# Patient Record
Sex: MALE | Race: WHITE | Employment: UNEMPLOYED | URBAN - METROPOLITAN AREA
[De-identification: names, ages, dates, MRNs, and addresses within clinical notes are randomized per-mention and may not be internally consistent; named-entity substitution may affect disease eponyms.]

---

## 2018-01-23 ENCOUNTER — OFFICE VISIT (OUTPATIENT)
Dept: FAMILY MEDICINE CLINIC | Age: 28
End: 2018-01-23
Payer: COMMERCIAL

## 2018-01-23 VITALS
HEART RATE: 64 BPM | TEMPERATURE: 97.3 F | WEIGHT: 207.6 LBS | DIASTOLIC BLOOD PRESSURE: 74 MMHG | OXYGEN SATURATION: 98 % | BODY MASS INDEX: 35.44 KG/M2 | HEIGHT: 64 IN | SYSTOLIC BLOOD PRESSURE: 114 MMHG

## 2018-01-23 DIAGNOSIS — M77.9 TENDONITIS: ICD-10-CM

## 2018-01-23 DIAGNOSIS — F17.200 TOBACCO DEPENDENCE: ICD-10-CM

## 2018-01-23 DIAGNOSIS — Z23 FLU VACCINE NEED: ICD-10-CM

## 2018-01-23 DIAGNOSIS — Z23 NEED FOR TDAP VACCINATION: ICD-10-CM

## 2018-01-23 DIAGNOSIS — M54.50 MIDLINE LOW BACK PAIN WITHOUT SCIATICA, UNSPECIFIED CHRONICITY: ICD-10-CM

## 2018-01-23 DIAGNOSIS — J45.30 MILD PERSISTENT ASTHMA WITHOUT COMPLICATION: Primary | ICD-10-CM

## 2018-01-23 PROCEDURE — G8417 CALC BMI ABV UP PARAM F/U: HCPCS | Performed by: NURSE PRACTITIONER

## 2018-01-23 PROCEDURE — 90471 IMMUNIZATION ADMIN: CPT | Performed by: NURSE PRACTITIONER

## 2018-01-23 PROCEDURE — 90715 TDAP VACCINE 7 YRS/> IM: CPT | Performed by: NURSE PRACTITIONER

## 2018-01-23 PROCEDURE — 90472 IMMUNIZATION ADMIN EACH ADD: CPT | Performed by: NURSE PRACTITIONER

## 2018-01-23 PROCEDURE — 99203 OFFICE O/P NEW LOW 30 MIN: CPT | Performed by: NURSE PRACTITIONER

## 2018-01-23 PROCEDURE — 90630 INFLUENZA, QUADV, 18-64 YRS, ID, PF, MICRO INJ, 0.1ML (FLUZONE QUADV, PF): CPT | Performed by: NURSE PRACTITIONER

## 2018-01-23 RX ORDER — ALBUTEROL SULFATE 90 UG/1
2 AEROSOL, METERED RESPIRATORY (INHALATION) EVERY 6 HOURS PRN
COMMUNITY
End: 2018-01-31 | Stop reason: SDUPTHER

## 2018-01-23 RX ORDER — TOPIRAMATE 50 MG/1
50 TABLET, FILM COATED ORAL
COMMUNITY
End: 2018-03-20 | Stop reason: SDUPTHER

## 2018-01-23 RX ORDER — LEVALBUTEROL TARTRATE 45 UG/1
1-2 AEROSOL, METERED ORAL EVERY 4 HOURS PRN
COMMUNITY
End: 2018-01-31 | Stop reason: SDUPTHER

## 2018-01-23 RX ORDER — MELATONIN/LEMON BALM LEAF EXTR 5MG-500MCG
TABLET ORAL
Qty: 30 PATCH | Refills: 1 | Status: SHIPPED | OUTPATIENT
Start: 2018-01-23 | End: 2018-03-20 | Stop reason: SDUPTHER

## 2018-01-23 RX ORDER — MELATONIN/LEMON BALM LEAF EXTR 5MG-500MCG
TABLET ORAL
Refills: 0 | COMMUNITY
Start: 2018-01-10 | End: 2018-01-23 | Stop reason: SDUPTHER

## 2018-01-23 ASSESSMENT — ENCOUNTER SYMPTOMS
SHORTNESS OF BREATH: 1
CHEST TIGHTNESS: 0
ABDOMINAL PAIN: 0
COUGH: 0
CONSTIPATION: 0
DIARRHEA: 0
VOMITING: 0
NAUSEA: 0
SORE THROAT: 0
WHEEZING: 1
FREQUENT THROAT CLEARING: 1
BACK PAIN: 1

## 2018-01-23 NOTE — PROGRESS NOTES
Visit Information    Have you changed or started any medications since your last visit including any over-the-counter medicines, vitamins, or herbal medicines? no   Have you stopped taking any of your medications? Is so, why? -  no  Are you having any side effects from any of your medications? - no    Have you seen any other physician or provider since your last visit?  no   Have you had any other diagnostic tests since your last visit?  no   Have you been seen in the emergency room and/or had an admission in a hospital since we last saw you?  no   Have you had your routine dental cleaning in the past 6 months?  no     Do you have an active MyChart account? If no, what is the barrier? No: na    Patient Care Team:  Pam Wilhelm CNP as PCP - General (Nurse Practitioner)    Medical History Review  Past Medical, Family, and Social History reviewed and  contribute to the patient presenting condition    Health Maintenance   Topic Date Due    HIV screen  06/16/2006    DTaP/Tdap/Td vaccine (1 - Tdap) 06/16/2010    Flu vaccine (1) 09/01/2017     After obtaining consent, and per orders of Raman Gandhi CNP, injection of Tdap and flu vaccine given in Left deltoid and Right deltoid by Ally Alcantar. Patient instructed to remain in clinic for 20 minutes afterwards, and to report any adverse reaction to me immediately.

## 2018-01-23 NOTE — PROGRESS NOTES
Smoker    Smokeless tobacco: Never Used    Alcohol use No      Current Outpatient Prescriptions   Medication Sig Dispense Refill    albuterol sulfate  (90 Base) MCG/ACT inhaler Inhale 2 puffs into the lungs every 6 hours as needed for Wheezing      levalbuterol (XOPENEX HFA) 45 MCG/ACT inhaler Inhale 1-2 puffs into the lungs every 4 hours as needed for Wheezing      tiotropium (SPIRIVA RESPIMAT) 1.25 MCG/ACT AERS inhaler Inhale 2 puffs into the lungs daily 4 g 3    NICOTINE STEP 3 7 MG/24HR ZULEIMA 1 PATCH ONTO THE SKIN QD. 30 patch 1    topiramate (TOPAMAX) 50 MG tablet Take 50 mg by mouth       No current facility-administered medications for this visit. Allergies   Allergen Reactions    Latex     Singulair [Montelukast] Shortness Of Breath    Penicillins     Tape [Adhesive Tape]        Health Maintenance   Topic Date Due    HIV screen  06/16/2006    DTaP/Tdap/Td vaccine (2 - Td) 01/23/2028    Flu vaccine  Completed       Subjective:      Review of Systems   Constitutional: Positive for unexpected weight change. Negative for appetite change, chills, diaphoresis, fatigue, fever and weight loss. HENT: Negative for postnasal drip, sneezing and sore throat. Eyes: Negative for visual disturbance. Respiratory: Positive for shortness of breath and wheezing. Negative for cough and chest tightness. Cardiovascular: Negative for chest pain, dyspnea on exertion, palpitations and leg swelling. Gastrointestinal: Negative for abdominal pain, constipation, diarrhea, nausea and vomiting. Endocrine: Negative for cold intolerance and heat intolerance. Musculoskeletal: Positive for arthralgias and back pain. Skin: Negative for rash. Allergic/Immunologic: Negative for immunocompromised state. Neurological: Negative for dizziness, weakness, light-headedness, numbness and headaches. Hematological: Negative for adenopathy.    Psychiatric/Behavioral: Negative for dysphoric mood and sleep Referral To Nutrition Services     Referral Priority:   Routine     Referral Type:   Consult for Advice and Opinion     Referral Reason:   Specialty Services Required     Requested Specialty:   Nutrition     Number of Visits Requested:   1    [place] CALC BMI ABV UP YUSUF F/U     Orders Placed This Encounter   Medications    tiotropium (SPIRIVA RESPIMAT) 1.25 MCG/ACT AERS inhaler     Sig: Inhale 2 puffs into the lungs daily     Dispense:  4 g     Refill:  3    NICOTINE STEP 3 7 MG/24HR     Sig: ZULEIMA 1 PATCH ONTO THE SKIN QD. Dispense:  30 patch     Refill:  1   Health Maintenance reviewed - tetanus booster given, Flu shot given. Step down nicotine patches rx    Asthma:  Uncontrolled  Trial spiriva respimat  Failed meds noted in HPI  Quitting smoking will help also    Weight:  Fu with dietician for diet change  150 mins exercise per week  Would like to avoid meds and insurance unlikely to cover. New order for PT given      Patient given educational materials - see patient instructions. Discussed use, benefit, and side effects of prescribed medications. All patient questions answered. Pt voiced understanding. Reviewed health maintenance. Instructed to continue current medications, diet and exercise.     Electronically signed by Josiah Rivera CNP on 1/23/2018 at 1:49 PM

## 2018-01-24 ENCOUNTER — TELEPHONE (OUTPATIENT)
Dept: FAMILY MEDICINE CLINIC | Age: 28
End: 2018-01-24

## 2018-01-25 ENCOUNTER — TELEPHONE (OUTPATIENT)
Dept: FAMILY MEDICINE CLINIC | Age: 28
End: 2018-01-25

## 2018-01-29 ENCOUNTER — TELEPHONE (OUTPATIENT)
Dept: FAMILY MEDICINE CLINIC | Age: 28
End: 2018-01-29

## 2018-01-31 RX ORDER — LEVALBUTEROL TARTRATE 45 UG/1
1-2 AEROSOL, METERED ORAL EVERY 4 HOURS PRN
Qty: 1 INHALER | Refills: 3 | Status: SHIPPED | OUTPATIENT
Start: 2018-01-31

## 2018-01-31 RX ORDER — ALBUTEROL SULFATE 90 UG/1
2 AEROSOL, METERED RESPIRATORY (INHALATION) EVERY 6 HOURS PRN
Qty: 1 INHALER | Refills: 3 | Status: SHIPPED | OUTPATIENT
Start: 2018-01-31 | End: 2021-02-17 | Stop reason: SDUPTHER

## 2018-03-20 ENCOUNTER — OFFICE VISIT (OUTPATIENT)
Dept: FAMILY MEDICINE CLINIC | Age: 28
End: 2018-03-20
Payer: COMMERCIAL

## 2018-03-20 VITALS
BODY MASS INDEX: 34.66 KG/M2 | OXYGEN SATURATION: 98 % | WEIGHT: 203 LBS | HEART RATE: 87 BPM | SYSTOLIC BLOOD PRESSURE: 130 MMHG | DIASTOLIC BLOOD PRESSURE: 76 MMHG | TEMPERATURE: 98.1 F | HEIGHT: 64 IN

## 2018-03-20 DIAGNOSIS — M54.50 CHRONIC MIDLINE LOW BACK PAIN WITHOUT SCIATICA: ICD-10-CM

## 2018-03-20 DIAGNOSIS — M25.561 CHRONIC PAIN OF BOTH KNEES: ICD-10-CM

## 2018-03-20 DIAGNOSIS — F17.200 TOBACCO DEPENDENCE: ICD-10-CM

## 2018-03-20 DIAGNOSIS — M25.562 CHRONIC PAIN OF BOTH KNEES: ICD-10-CM

## 2018-03-20 DIAGNOSIS — Z23 NEED FOR PNEUMOCOCCAL VACCINATION: ICD-10-CM

## 2018-03-20 DIAGNOSIS — F95.2 TOURETTE'S DISEASE: Primary | ICD-10-CM

## 2018-03-20 DIAGNOSIS — G89.29 CHRONIC MIDLINE LOW BACK PAIN WITHOUT SCIATICA: ICD-10-CM

## 2018-03-20 DIAGNOSIS — G89.29 CHRONIC PAIN OF BOTH KNEES: ICD-10-CM

## 2018-03-20 PROCEDURE — 99213 OFFICE O/P EST LOW 20 MIN: CPT | Performed by: NURSE PRACTITIONER

## 2018-03-20 PROCEDURE — 90471 IMMUNIZATION ADMIN: CPT | Performed by: NURSE PRACTITIONER

## 2018-03-20 PROCEDURE — 90732 PPSV23 VACC 2 YRS+ SUBQ/IM: CPT | Performed by: NURSE PRACTITIONER

## 2018-03-20 RX ORDER — MELATONIN/LEMON BALM LEAF EXTR 5MG-500MCG
TABLET ORAL
Qty: 30 PATCH | Refills: 1 | Status: SHIPPED | OUTPATIENT
Start: 2018-03-20

## 2018-03-20 RX ORDER — TOPIRAMATE 50 MG/1
50 TABLET, FILM COATED ORAL DAILY
Qty: 30 TABLET | Refills: 5 | Status: SHIPPED | OUTPATIENT
Start: 2018-03-20 | End: 2021-01-27 | Stop reason: SDUPTHER

## 2018-03-20 ASSESSMENT — ENCOUNTER SYMPTOMS
ABDOMINAL PAIN: 0
COUGH: 0
SHORTNESS OF BREATH: 0
BACK PAIN: 1
CHEST TIGHTNESS: 0
VOMITING: 0
NAUSEA: 0

## 2018-03-20 NOTE — PROGRESS NOTES
[address] [facility] [address], [address]  [phone]      Damien Pete is a 33 y.o. male who presents today for his  medical conditions/complaints as noted below. Damien Pete is c/o of Arthritis (form for placard,would like refill on nicotin patch,med/small)  . HPI:   Pt here for med refills on topamax which he takes daily for tourettes. He is stable on this med. He also would like to continue nicotine patches at 7 mg. Has ongoing bilateral knee pain that he states was diagnosed as arthritis 8 years ago in [place]. He would like a handicap placard for this since the pain limits him from walking, was also dx'd with osgood schlatter's at age 16. Also has low back while walking. Pt did not follow up with dietician or start exercise/workout program since last office visit. No trauma or injury to legs or low back in the past. 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 Past Medical History:   Diagnosis Date    Allergic rhinitis     Asthma     Mild persistent asthma without complication 9/12/7014    Tobacco dependence 1/23/2018    Tourette's disease 3/20/2018      History reviewed. No pertinent surgical history.   Family History   Problem Relation Age of Onset    High Blood Pressure Father      Social History   Substance Use Topics    Smoking status: Former Smoker    Smokeless tobacco: Never Used    Alcohol use No      Current Outpatient Prescriptions   Medication Sig Dispense Refill    NICOTINE STEP 3 7 MG/24HR ZULEIMA 1 PATCH ONTO THE SKIN QD. 30 patch 1    topiramate (TOPAMAX) 50 MG tablet Take 1 tablet by mouth daily 30 tablet 5    levalbuterol (XOPENEX HFA) 45 MCG/ACT inhaler Inhale 1-2 puffs into the lungs every 4 hours as needed for Wheezing 1 Inhaler 3    albuterol sulfate  (90 Base) MCG/ACT inhaler Inhale 2 puffs into the lungs every 6 hours as needed for Wheezing 1 Inhaler 3     No current facility-administered medications for this Pneumococcal polysaccharide vaccine 23-valent greater than or equal to 1yo subcutaneous/IM   5. Chronic midline low back pain without sciatica  XR LUMBAR SPINE (2-3 VIEWS)     Xray bilateral kneeds reviewed today and no acute changes noted, abnormal shadowing noted on lateral images bilaterall   will await for final rad report  Xray lumbar reviewed today and great spacing noted in between discs, no acute changes    Plan:      Return if symptoms worsen or fail to improve. Orders Placed This Encounter   Procedures    XR KNEE BILATERAL STANDING     Order Specific Question:   Reason for exam:     Answer:   eval for arthritis    XR LUMBAR SPINE (2-3 VIEWS)     Order Specific Question:   Reason for exam:     Answer:   pain    Pneumococcal polysaccharide vaccine 23-valent greater than or equal to 1yo subcutaneous/IM     Orders Placed This Encounter   Medications    NICOTINE STEP 3 7 MG/24HR     Sig: ZULEIMA 1 PATCH ONTO THE SKIN QD. Dispense:  30 patch     Refill:  1    topiramate (TOPAMAX) 50 MG tablet     Sig: Take 1 tablet by mouth daily     Dispense:  30 tablet     Refill:  5   refill other meds    Pneumovax given    Back:  Weight loss advised  Yoga for stretching and regular exercise    Knees: Will await final rad report before handicap placard, will likely hold on this  Weight loss advised      Patient given educational materials - see patient instructions. Discussed use, benefit, and side effects of prescribed medications. All patient questions answered. Pt voiced understanding. Reviewed health maintenance. Instructed to continue current medications, diet and exercise.     Electronically signed by Tashi Rivera CNP on 3/20/2018 at 11:38 AM

## 2018-03-22 ENCOUNTER — TELEPHONE (OUTPATIENT)
Dept: FAMILY MEDICINE CLINIC | Age: 28
End: 2018-03-22

## 2018-03-22 NOTE — TELEPHONE ENCOUNTER
Pt does not want to do PT, states he has tried 4 different times and it does not help. Has [facility] and does go there. Advised exercise is the best thing to help with arthritis pain. Not wanting to take meds, wondering about other options.

## 2020-10-30 ENCOUNTER — VIRTUAL VISIT (OUTPATIENT)
Dept: FAMILY MEDICINE CLINIC | Age: 30
End: 2020-10-30
Payer: MEDICARE

## 2020-10-30 PROBLEM — J45.909 ASTHMA: Status: ACTIVE | Noted: 2018-05-08

## 2020-10-30 PROBLEM — F90.9 ADHD: Status: ACTIVE | Noted: 2020-07-31

## 2020-10-30 PROBLEM — K58.2 IRRITABLE BOWEL SYNDROME WITH BOTH CONSTIPATION AND DIARRHEA: Status: ACTIVE | Noted: 2020-07-31

## 2020-10-30 PROCEDURE — 99213 OFFICE O/P EST LOW 20 MIN: CPT | Performed by: NURSE PRACTITIONER

## 2020-10-30 PROCEDURE — G8427 DOCREV CUR MEDS BY ELIG CLIN: HCPCS | Performed by: NURSE PRACTITIONER

## 2020-10-30 ASSESSMENT — PATIENT HEALTH QUESTIONNAIRE - PHQ9
SUM OF ALL RESPONSES TO PHQ QUESTIONS 1-9: 0
SUM OF ALL RESPONSES TO PHQ9 QUESTIONS 1 & 2: 0
SUM OF ALL RESPONSES TO PHQ QUESTIONS 1-9: 0
2. FEELING DOWN, DEPRESSED OR HOPELESS: 0
1. LITTLE INTEREST OR PLEASURE IN DOING THINGS: 0
SUM OF ALL RESPONSES TO PHQ QUESTIONS 1-9: 0

## 2020-10-30 NOTE — LETTER
[address] [place] [address] [address] [address]  Phone: [phone]  Fax: [phone]    FATUMA Stanton CNP        2020    Re: Dianne Jacobsen  : 5-    To whom this may concern:  Based on chest xray results ( 2 views) from 2020, I see not signs or concerns for tuberculosis.        Sincerely,        FATUMA Stanton CNP

## 2020-10-30 NOTE — PROGRESS NOTES
10/30/2020    TELEHEALTH EVALUATION -- Audio/Visual (During HVOWH-40 public health emergency)    HPI:    Khalida Briggs (:  1990) has requested an audio/video evaluation for the following concern(s):    Pt. Calling in today for letter for foster care. He and his wife are trying to start foster care but are getting frustrated with last pcp not getting them documentation he needs. He was tested for TB with serum and has reaction. pcp then ordered CXR which was negative on 20. She then made his see allergist who also stated it was allergic reaction to serum. He needs letter for foster care starting cxr was negative. He has not symptoms of cough, sob, wheezing, fatigue or other illness. Review of Systems    Prior to Visit Medications    Medication Sig Taking? Authorizing Provider   NICOTINE STEP 3 7 MG/24HR ZULEIMA 1 PATCH ONTO THE SKIN QD. Will FATUMA Sanchez CNP   topiramate (TOPAMAX) 50 MG tablet Take 1 tablet by mouth daily  Will FATUMA Sanchez CNP   levalbuterol [employer] HFA) 45 MCG/ACT inhaler Inhale 1-2 puffs into the lungs every 4 hours as needed for Wheezing  Will FATUMA Sanchez CNP   albuterol sulfate  (90 Base) MCG/ACT inhaler Inhale 2 puffs into the lungs every 6 hours as needed for Wheezing  Will FATUMA Sanchez CNP       Social History     Tobacco Use    Smoking status: Former Smoker    Smokeless tobacco: Never Used   Substance Use Topics    Alcohol use: No    Drug use:  No            PHYSICAL EXAMINATION:  [ INSTRUCTIONS:  \"[x]\" Indicates a positive item  \"[]\" Indicates a negative item  -- DELETE ALL ITEMS NOT EXAMINED]  Vital Signs: (As obtained by patient/caregiver or practitioner observation)      Constitutional: [x] Appears well-developed and well-nourished [x] No apparent distress      [] Abnormal-   Mental status  [x] Alert and awake  [x] Oriented to person/place/time [x]Able to follow commands      Eyes:  EOM    []  Normal  [] Abnormal-  Sclera  []  Normal  [] Abnormal -         Discharge [x]  None visible  [] Abnormal -    HENT:   [x] Normocephalic, atraumatic. [] Abnormal   [x] Mouth/Throat: Mucous membranes are moist.     External Ears [x] Normal  [] Abnormal-     Neck: [x] No visualized mass     Pulmonary/Chest: [x] Respiratory effort normal.  [x] No visualized signs of difficulty breathing or respiratory distress        [] Abnormal-      Musculoskeletal:   [x] Normal gait with no signs of ataxia         [x] Normal range of motion of neck        [] Abnormal-       Neurological:        [x] No Facial Asymmetry (Cranial nerve 7 motor function) (limited exam to video visit)          [x] No gaze palsy        [] Abnormal-         Skin:        [x] No significant exanthematous lesions or discoloration noted on facial skin         [] Abnormal-            Psychiatric:       [x] Normal Affect [x] No Hallucinations        [] Abnormal-     Other pertinent observable physical exam findings-     ASSESSMENT/PLAN:  1. Allergic reaction, sequela  cxr reviewed from 9-25=20 and no sign of TB  Letter given for foster care  Rash resolved from serum from TB test    2. Screening for tuberculosis  Needs cxr in future       Return if symptoms worsen or fail to improve. Gigi Austin is a 35 y.o. male being evaluated by a Virtual Visit (video visit) encounter to address concerns as mentioned above. A caregiver was present when appropriate. Due to this being a TeleHealth encounter (During ZZXYK-84 public health emergency), evaluation of the following organ systems was limited: Vitals/Constitutional/EENT/Resp/CV/GI//MS/Neuro/Skin/Heme-Lymph-Imm.   Pursuant to the emergency declaration under the [address] [place] [address], [address] [place] [address] authority and the [employer] Act, this Virtual Visit was conducted with patient's (and/or legal guardian's) consent, to reduce the patient's risk of exposure to COVID-19 and provide necessary medical care. The patient (and/or legal guardian) has also been advised to contact this office for worsening conditions or problems, and seek emergency medical treatment and/or call 911 if deemed necessary. Patient identification was verified at the start of the visit: Yes    Total time spent on this encounter: Not billed by time    Services were provided through a video synchronous discussion virtually to substitute for in-person clinic visit. Patient and provider were located at their individual homes. --FATUMA Abrams CNP on 10/30/2020 at 12:09 PM    An electronic signature was used to authenticate this note.

## 2020-10-30 NOTE — LETTER
[address] [place] [address]  [place] [address]  Phone: [phone]  Fax: [phone]    FATUMA Johns CNP        2020  Re: Sabinohal Aguileraman  : [birth year]    To whom this may concern:  Based on chest xray ( 2 views) obtained in 2020 I do not see any signs of tuberculosis.        Sincerely,        FATUMA Johns CNP

## 2020-11-24 ENCOUNTER — TELEPHONE (OUTPATIENT)
Dept: FAMILY MEDICINE CLINIC | Age: 30
End: 2020-11-24

## 2020-11-24 RX ORDER — LORATADINE 10 MG/1
10 TABLET ORAL DAILY
Qty: 90 TABLET | Refills: 1 | Status: SHIPPED | OUTPATIENT
Start: 2020-11-24

## 2020-11-24 NOTE — TELEPHONE ENCOUNTER
Patient calling asking if Claritin can be sent for patient. Patient states that he is on Claritin regularly for allergies. Patient is also asking if he can try intunib?      Please advise   Thank you

## 2021-01-20 ENCOUNTER — PATIENT MESSAGE (OUTPATIENT)
Dept: FAMILY MEDICINE CLINIC | Age: 31
End: 2021-01-20

## 2021-01-20 NOTE — TELEPHONE ENCOUNTER
From: Lyle Awan  To: FATUMA Garcia - CNP  Sent: 1/20/2021 3:33 PM EST  Subject: Non-Urgent Medical Question    Good Afternoon,    I was wondering if you would be able to fill out this form for me for school in regards to my Tourette's. I just need it documented in order to obtain accommodations in school. If you could send this in by the end of the week that would be greatly appreciated! If you have any questions feel free to give Gustavo or I a call!

## 2021-01-27 DIAGNOSIS — F95.2 TOURETTE'S DISEASE: ICD-10-CM

## 2021-01-27 RX ORDER — TOPIRAMATE 50 MG/1
50 TABLET, FILM COATED ORAL DAILY
Qty: 90 TABLET | Refills: 3 | Status: SHIPPED | OUTPATIENT
Start: 2021-01-27 | End: 2021-10-24

## 2021-02-16 ENCOUNTER — PATIENT MESSAGE (OUTPATIENT)
Dept: FAMILY MEDICINE CLINIC | Age: 31
End: 2021-02-16

## 2021-02-17 RX ORDER — ALBUTEROL SULFATE 90 UG/1
2 AEROSOL, METERED RESPIRATORY (INHALATION) EVERY 6 HOURS PRN
Qty: 1 INHALER | Refills: 3 | Status: SHIPPED | OUTPATIENT
Start: 2021-02-17 | End: 2021-05-27

## 2021-02-17 NOTE — TELEPHONE ENCOUNTER
From: Bishop Priest  To: Puma Goode, APRN - CNP  Sent: 2/16/2021 4:41 PM EST  Subject: Non-Urgent Medical Question    I was unable to request a refill but I am needing a refill on my Ventolin inhaler. [facility] used to take care of that for me. Thanks so much!

## 2021-02-25 ENCOUNTER — PATIENT MESSAGE (OUTPATIENT)
Dept: FAMILY MEDICINE CLINIC | Age: 31
End: 2021-02-25

## 2021-02-25 DIAGNOSIS — F95.2 TOURETTE'S DISEASE: Primary | ICD-10-CM

## 2021-02-25 DIAGNOSIS — F90.9 ATTENTION DEFICIT HYPERACTIVITY DISORDER (ADHD), UNSPECIFIED ADHD TYPE: ICD-10-CM

## 2021-02-26 RX ORDER — GUANFACINE 1 MG/1
1 TABLET, EXTENDED RELEASE ORAL NIGHTLY
Qty: 30 TABLET | Refills: 0 | Status: SHIPPED | OUTPATIENT
Start: 2021-02-26 | End: 2021-03-22 | Stop reason: SDUPTHER

## 2021-02-26 NOTE — TELEPHONE ENCOUNTER
From: Rodriguez House  To: FATUMA Lawler - CNP  Sent: 2/25/2021 7:12 PM EST  Subject: Non-Urgent Medical Question    I have recently started my masters program and am struggling with my ADHD. After some research of my own I am wanting to know if you would be willing to prescribe Guanfacine/Intuniv to see if it helps with my symptoms. If that is okay with you I would be grateful if you'd send it to [facility] here in [place]. Thank you!

## 2021-03-05 ENCOUNTER — VIRTUAL VISIT (OUTPATIENT)
Dept: FAMILY MEDICINE CLINIC | Age: 31
End: 2021-03-05
Payer: MEDICARE

## 2021-03-05 DIAGNOSIS — Z00.00 MEDICARE ANNUAL WELLNESS VISIT, SUBSEQUENT: Primary | ICD-10-CM

## 2021-03-05 DIAGNOSIS — Z00.00 ROUTINE GENERAL MEDICAL EXAMINATION AT A HEALTH CARE FACILITY: ICD-10-CM

## 2021-03-05 PROCEDURE — G0439 PPPS, SUBSEQ VISIT: HCPCS | Performed by: NURSE PRACTITIONER

## 2021-03-05 ASSESSMENT — PATIENT HEALTH QUESTIONNAIRE - PHQ9
SUM OF ALL RESPONSES TO PHQ QUESTIONS 1-9: 0
SUM OF ALL RESPONSES TO PHQ9 QUESTIONS 1 & 2: 0
SUM OF ALL RESPONSES TO PHQ QUESTIONS 1-9: 0

## 2021-03-05 NOTE — PROGRESS NOTES
Medicare Annual Wellness Visit  Name: Beatrix Habermann Date: 3/5/2021   MRN: [id number] Sex: Male   Age: 35 y.o. Ethnicity: Non-/Non    : 1990 Race: Nawaf Franklin is here for Medicare AWV    Screenings for behavioral, psychosocial and functional/safety risks, and cognitive dysfunction are all negative except as indicated below. These results, as well as other patient data from the [address] form, are documented in Flowsheets linked to this Encounter. Allergies   Allergen Reactions    Latex     Singulair [Montelukast] Shortness Of Breath    Penicillins     Tape [Adhesive Tape]          Prior to Visit Medications    Medication Sig Taking? Authorizing Provider   guanFACINE (INTUNIV) 1 MG TB24 extended release tablet Take 1 tablet by mouth nightly Yes FATUMA Noble CNP   albuterol sulfate  (90 Base) MCG/ACT inhaler Inhale 2 puffs into the lungs every 6 hours as needed for Wheezing Yes FATUMA Noble CNP   topiramate (TOPAMAX) 50 MG tablet Take 1 tablet by mouth daily Yes FATUMA Noble CNP   loratadine (CLARITIN) 10 MG tablet Take 1 tablet by mouth daily Yes FATUMA Noble CNP   NICOTINE STEP 3 7 MG/24HR ZULEIMA 1 PATCH ONTO THE SKIN QD. Yes FATUMA Noble CNP   levalbuterol [employer] HFA) 45 MCG/ACT inhaler Inhale 1-2 puffs into the lungs every 4 hours as needed for Wheezing Yes FATUMA Noble CNP         Past Medical History:   Diagnosis Date    Allergic rhinitis     Asthma     Mild persistent asthma without complication     Tobacco dependence 2018    Tourette's disease 3/20/2018       History reviewed. No pertinent surgical history.       Family History   Problem Relation Age of Onset    High Blood Pressure Father        CareTeam (Including outside providers/suppliers regularly involved in providing care):   Patient Care Team: FATUMA Quiros CNP as PCP - General (Nurse Practitioner)  FATUMA Quiros CNP as PCP - [facility] Empaneled Provider    Wt Readings from Last 3 Encounters:   03/20/18 203 lb (92.1 kg)   01/23/18 207 lb 9.6 oz (94.2 kg)     Patient-Reported Vitals 10/30/2020   Patient-Reported Weight 200   Patient-Reported Height 5'4      There is no height or weight on file to calculate BMI. Based upon direct observation of the patient, evaluation of cognition reveals recent and remote memory intact. Patient's complete Health Risk Assessment and screening values have been reviewed and are found in Flowsheets. The following problems were reviewed today and where indicated follow up appointments were made and/or referrals ordered. Positive Risk Factor Screenings with Interventions:            General Health and ACP:  General  In general, how would you say your health is?: Good  In the past 7 days, have you experienced any of the following?  New or Increased Pain, New or Increased Fatigue, Loneliness, Social Isolation, Stress or Anger?: (!) New or Increased Pain(joints/due to weather)  Do you get the social and emotional support that you need?: Yes  Do you have a Living Will?: (!) No  Advance Directives     Power of  Living Will ACP-Advance Directive ACP-Power of     Not on File Not on File Not on File Not on File      General Health Risk Interventions:  · Pain issues: patient declines any further evaluation/treatment for this issue  · No Living Will: Patient declines ACP discussion/assistance    Health Habits/Nutrition:  Health Habits/Nutrition  Do you exercise for at least 20 minutes 2-3 times per week?: Yes  Have you lost any weight without trying in the past 3 months?: No  Do you eat only one meal per day?: No  Have you seen the dentist within the past year?: Yes     Health Habits/Nutrition Interventions:  · NA    Hearing/Vision:  No exam data present  Hearing/Vision Do you or your family notice any trouble with your hearing that hasn't been managed with hearing aids?: (!) Yes(hearing aids)  Do you have difficulty driving, watching TV, or doing any of your daily activities because of your eyesight?: No  Have you had an eye exam within the past year?: Appointment is scheduled  Hearing/Vision Interventions:  · Hearing concerns:  patient declines any further evaluation/treatment for hearing issues    wearing hearing aids  Personalized Preventive Plan   Current Health Maintenance Status  Immunization History   Administered Date(s) Administered    DTaP (Infanrix) 03/26/1996    Hepatitis B vaccine 03/26/1996, 05/03/1996, 02/14/1997    Hib vaccine 1990, 1990, 01/30/1992, 12/16/1992    Influenza A ([id number]) Vaccine IM 11/06/2009    Influenza Virus Vaccine 10/21/2004, 11/17/2005, 10/05/2006, 11/06/2009, 11/04/2010, 11/03/2011, 11/13/2012, 11/06/2013, 08/10/2015, 10/23/2018, 09/25/2019    Influenza, Intradermal, Quadrivalent, Preservative Free 01/23/2018    MMR 09/21/1992, 03/26/1996    PPD Test 07/30/2018, 09/23/2020    Pneumococcal Polysaccharide ([id number]) 03/20/2018    Pneumococcal Vaccine 03/20/2018    Tdap (Boostrix, Adacel) 08/12/2009, 01/23/2018    Varicella (Varivax) 07/19/1999        Health Maintenance   Topic Date Due    Hepatitis C screen  Never done    Varicella vaccine (2 of 2 - 2-dose childhood series) 10/11/1999    HIV screen  Never done    Annual Wellness Visit (AWV)  Never done    Flu vaccine (1) 03/05/2022 (Originally 9/1/2020)    DTaP/Tdap/Td vaccine (4 - Td) 01/23/2028    Hepatitis B vaccine  Completed    Hib vaccine  Completed    Pneumococcal 0-64 years Vaccine  Completed    Hepatitis A vaccine  Aged Out    Meningococcal (ACWY) vaccine  Aged Out     Recommendations for [employer] Due: see orders and patient instructions/AVS.  . Recommended screening schedule for the next 5-10 years is provided to the patient in written form: see Patient Instructions/AVS.    Mauricio Hui was seen today for medicare awv. Diagnoses and all orders for this visit:    Medicare annual wellness visit, subsequent    Routine general medical examination at a health care facility         pt doing great overall  In masters program for psychology  Doing well with new med addition Intuniv, continue to monitor HR/BP at home, helping with adhd        Lilian Gitelman is a 35 y.o. male being evaluated by a Virtual Visit (video and audio) encounter to address concerns as mentioned above. A caregiver was present when appropriate. Due to this being a TeleHealth encounter ([facility]), evaluation of the following organ systems was limited: Vitals/Constitutional/EENT/Resp/CV/GI//MS/Neuro/Skin/Heme-Lymph-Imm. Pursuant to the emergency declaration under the [address] [place] [address], [address] [place] [address] authority and the [employer] Act, this Virtual Visit was conducted with patient's (and/or legal guardian's) consent, to reduce the patient's risk of exposure to COVID-19 and provide necessary medical care. The patient (and/or legal guardian) has also been advised to contact this office for worsening conditions or problems, and seek emergency medical treatment and/or call 911 if deemed necessary. Patient identification was verified at the start of the visit: Yes    Services were provided through a video synchronous discussion virtually to substitute for in-person clinic visit. Patient and provider were located at their individual homes. --FATUMA Covarrubias - CNP on 3/5/2021 at 12:53 PM    An electronic signature was used to authenticate this note.

## 2021-03-05 NOTE — PATIENT INSTRUCTIONS
Personalized Preventive Plan for [employer] - 3/5/2021  Medicare offers a range of preventive health benefits. Some of the tests and screenings are paid in full while other may be subject to a deductible, co-insurance, and/or copay. Some of these benefits include a comprehensive review of your medical history including lifestyle, illnesses that may run in your family, and various assessments and screenings as appropriate. After reviewing your medical record and screening and assessments performed today your provider may have ordered immunizations, labs, imaging, and/or referrals for you. A list of these orders (if applicable) as well as your Preventive Care list are included within your After Visit Summary for your review. Other Preventive Recommendations:    · A preventive eye exam performed by an eye specialist is recommended every 1-2 years to screen for glaucoma; cataracts, macular degeneration, and other eye disorders. · A preventive dental visit is recommended every 6 months. · Try to get at least 150 minutes of exercise per week or 10,000 steps per day on a pedometer . · Order or download the FREE \"Exercise & Physical Activity: Your Everyday Guide\" from [employer]. Call 5-207.590.3695 or search [employer] online. · You need 8478-3718 mg of calcium and 6452-6908 IU of vitamin D per day. It is possible to meet your calcium requirement with diet alone, but a vitamin D supplement is usually necessary to meet this goal.  · When exposed to the sun, use a sunscreen that protects against both UVA and UVB radiation with an SPF of 30 or greater. Reapply every 2 to 3 hours or after sweating, drying off with a towel, or swimming. · Always wear a seat belt when traveling in a car. Always wear a helmet when riding a bicycle or motorcycle.

## 2021-03-20 ENCOUNTER — PATIENT MESSAGE (OUTPATIENT)
Dept: FAMILY MEDICINE CLINIC | Age: 31
End: 2021-03-20

## 2021-03-20 DIAGNOSIS — F90.9 ATTENTION DEFICIT HYPERACTIVITY DISORDER (ADHD), UNSPECIFIED ADHD TYPE: ICD-10-CM

## 2021-03-20 DIAGNOSIS — F95.2 TOURETTE'S DISEASE: ICD-10-CM

## 2021-03-22 RX ORDER — GUANFACINE 1 MG/1
1 TABLET, EXTENDED RELEASE ORAL NIGHTLY
Qty: 30 TABLET | Refills: 5 | Status: SHIPPED | OUTPATIENT
Start: 2021-03-22 | End: 2021-11-19 | Stop reason: SDUPTHER

## 2021-03-22 NOTE — TELEPHONE ENCOUNTER
From: Goldy Longoria  To: Anyse Lpoez, APRN - CNP  Sent: 3/20/2021 12:42 PM EDT  Subject: Prescription Question    Would you please send over a new script for the Intuniv? I am leaving Thursday out of town and will need a refill beforehand. Thank you!

## 2021-07-19 ENCOUNTER — TELEPHONE (OUTPATIENT)
Dept: FAMILY MEDICINE CLINIC | Age: 31
End: 2021-07-19

## 2021-07-19 NOTE — TELEPHONE ENCOUNTER
Wife calling she is asking if you can look at patients xray again. She stated last year his TB was positive so he had to do an xray. She states this year he did labs and it came back positive. He is doing xray today and having it faxed over for your opinion.

## 2021-07-19 NOTE — TELEPHONE ENCOUNTER
----- Message from Alessandro Dee sent at 7/19/2021  3:47 PM EDT -----  Subject: Message to Provider    QUESTIONS  Information for Provider? Alessia lopez was clear, he has the dormant   version of TB, needs medication for it, please call and f/u  ---------------------------------------------------------------------------  --------------  CALL BACK INFO  What is the best way for the office to contact you? OK to leave message on   voicemail  Preferred Call Back Phone Number? [phone]  ---------------------------------------------------------------------------  --------------  SCRIPT ANSWERS  Relationship to Patient? Other  Representative Name? wife  Is the Representative on the appropriate HIPAA document in Epic?  Yes

## 2021-08-15 ENCOUNTER — PATIENT MESSAGE (OUTPATIENT)
Dept: FAMILY MEDICINE CLINIC | Age: 31
End: 2021-08-15

## 2021-08-16 NOTE — TELEPHONE ENCOUNTER
From: Irais Linder  To: Josh Madisondorina, APRN - CNP  Sent: 8/15/2021 7:07 PM EDT  Subject: Non-Urgent Medical Question    If you could give me a call tomorrow sometime in the afternoon there are a few things I would like to discuss with you, please. (Results to tb test, med switch, me being excessively tired)  [phone] (Gustavo)     Thank you!

## 2021-08-17 ENCOUNTER — TELEMEDICINE (OUTPATIENT)
Dept: FAMILY MEDICINE CLINIC | Age: 31
End: 2021-08-17
Payer: MEDICARE

## 2021-08-17 DIAGNOSIS — F90.9 ATTENTION DEFICIT HYPERACTIVITY DISORDER (ADHD), UNSPECIFIED ADHD TYPE: Primary | ICD-10-CM

## 2021-08-17 DIAGNOSIS — R53.83 FATIGUE, UNSPECIFIED TYPE: ICD-10-CM

## 2021-08-17 PROBLEM — J45.30 MILD PERSISTENT ASTHMA WITHOUT COMPLICATION: Status: RESOLVED | Noted: 2018-01-23 | Resolved: 2021-08-17

## 2021-08-17 PROBLEM — F17.200 TOBACCO DEPENDENCE: Status: RESOLVED | Noted: 2018-01-23 | Resolved: 2021-08-17

## 2021-08-17 PROBLEM — J45.909 ASTHMA: Status: RESOLVED | Noted: 2018-05-08 | Resolved: 2021-08-17

## 2021-08-17 PROCEDURE — 99442 PR PHYS/QHP TELEPHONE EVALUATION 11-20 MIN: CPT | Performed by: NURSE PRACTITIONER

## 2021-08-17 NOTE — PROGRESS NOTES
Visit Information    Have you changed or started any medications since your last visit including any over-the-counter medicines, vitamins, or herbal medicines? no   Have you stopped taking any of your medications? Is so, why? -  no  Are you having any side effects from any of your medications? - no    Have you seen any other physician or provider since your last visit?  no   Have you had any other diagnostic tests since your last visit?  no   Have you been seen in the emergency room and/or had an admission in a hospital since we last saw you?  no   Have you had your routine dental cleaning in the past 6 months?  no     Do you have an active MyChart account? If no, what is the barrier?   Yes    Patient Care Team:  FATUMA Gonzalez CNP as PCP - General (Nurse Practitioner)  FATUMA Gonzalez CNP as PCP - [facility]    Medical History Review  Past Medical, Family, and Social History reviewed and  contribute to the patient presenting condition    Health Maintenance   Topic Date Due    Hepatitis C screen  Never done    Varicella vaccine (2 of 2 - 2-dose childhood series) 10/11/1999    COVID-19 Vaccine (1) Never done    HIV screen  Never done    Flu vaccine (1) 09/01/2021    Annual Wellness Visit (AWV)  03/06/2022    DTaP/Tdap/Td vaccine (4 - Td or Tdap) 01/23/2028    Pneumococcal 0-64 years Vaccine (2 of 2 - PPSV23) 06/16/2056    Hepatitis B vaccine  Completed    Hib vaccine  Completed    Hepatitis A vaccine  Aged Out    Meningococcal (ACWY) vaccine  Aged Out
affirm this is a Patient Initiated Episode with a Patient who has not had a related appointment within my department in the past 7 days or scheduled within the next 24 hours. Patient identification was verified at the start of the visit: Yes    Total Time: minutes: 11-20 minutes    The visit was conducted pursuant to the emergency declaration under the [address] [place] [address], [address] [place] [address] and the [employer] General Act. Patient identification was verified, and a caregiver was present when appropriate. The patient was located in a state where the provider was credentialed to provide care.     Note: not billable if this call serves to triage the patient into an appointment for the relevant concern      FATUMA Dozier - CNP

## 2021-10-23 DIAGNOSIS — F95.2 TOURETTE'S DISEASE: ICD-10-CM

## 2021-10-24 RX ORDER — TOPIRAMATE 50 MG/1
50 TABLET, FILM COATED ORAL DAILY
Qty: 90 TABLET | Refills: 3 | Status: SHIPPED | OUTPATIENT
Start: 2021-10-24 | End: 2022-09-23 | Stop reason: SDUPTHER

## 2021-11-19 ENCOUNTER — PATIENT MESSAGE (OUTPATIENT)
Dept: FAMILY MEDICINE CLINIC | Age: 31
End: 2021-11-19

## 2021-11-19 DIAGNOSIS — F95.2 TOURETTE'S DISEASE: ICD-10-CM

## 2021-11-19 DIAGNOSIS — F90.9 ATTENTION DEFICIT HYPERACTIVITY DISORDER (ADHD), UNSPECIFIED ADHD TYPE: ICD-10-CM

## 2021-11-19 RX ORDER — GUANFACINE 1 MG/1
1 TABLET, EXTENDED RELEASE ORAL NIGHTLY
Qty: 30 TABLET | Refills: 5 | Status: SHIPPED | OUTPATIENT
Start: 2021-11-19 | End: 2022-03-17

## 2021-11-19 NOTE — TELEPHONE ENCOUNTER
From: Veena Valente  To: Marti Pereyra  Sent: 11/19/2021 10:58 AM EST  Subject: Prescription Question    Jerry Montez,    Do I need an appointment to restart my intuniv? I would like to start it as soon as possible. I start school back up in January. So December will give my body the time it needs to get use to that medication again.      Zoë Mcdonnell

## 2022-01-07 ENCOUNTER — TELEMEDICINE (OUTPATIENT)
Dept: FAMILY MEDICINE CLINIC | Age: 32
End: 2022-01-07
Payer: MEDICARE

## 2022-01-07 DIAGNOSIS — R25.1 TREMOR OF RIGHT HAND: ICD-10-CM

## 2022-01-07 DIAGNOSIS — R29.898 RIGHT HAND WEAKNESS: Primary | ICD-10-CM

## 2022-01-07 PROCEDURE — G8427 DOCREV CUR MEDS BY ELIG CLIN: HCPCS | Performed by: NURSE PRACTITIONER

## 2022-01-07 PROCEDURE — 99213 OFFICE O/P EST LOW 20 MIN: CPT | Performed by: NURSE PRACTITIONER

## 2022-01-07 NOTE — PROGRESS NOTES
Visit Information    Have you changed or started any medications since your last visit including any over-the-counter medicines, vitamins, or herbal medicines? no   Have you stopped taking any of your medications? Is so, why? -  no  Are you having any side effects from any of your medications? - no    Have you seen any other physician or provider since your last visit?  no   Have you had any other diagnostic tests since your last visit?  no   Have you been seen in the emergency room and/or had an admission in a hospital since we last saw you?  no   Have you had your routine dental cleaning in the past 6 months?  no     Do you have an active MyChart account? If no, what is the barrier?   Yes    Patient Care Team:  FATUMA Briceno CNP as PCP - General (Nurse Practitioner)  FATUMA [facility]riceno CNP [facility]    Medical History Review  Past Medical, Family, and Social History reviewed and  contribute to the patient presenting condition    Health Maintenance   Topic Date Due    Hepatitis C screen  Never done    COVID-19 Vaccine (1) Never done    Varicella vaccine (2 of 2 - 2-dose childhood series) 10/11/1999    HIV screen  Never done    Flu vaccine (1) 09/01/2021    Depression Screen  03/05/2022    DTaP/Tdap/Td vaccine (4 - Td or Tdap) 01/23/2028    Hepatitis B vaccine  Completed    Hib vaccine  Completed    Hepatitis A vaccine  Aged Out    Meningococcal (ACWY) vaccine  Aged Out    Pneumococcal 0-64 years Vaccine  Aged Out
Pulmonary/Chest: [x] Respiratory effort normal   [x] No visualized signs of difficulty breathing or respiratory distress        [] Abnormal -      Musculoskeletal:   [x] Normal gait with no signs of ataxia         [x] Normal range of motion of neck        [] Abnormal -     Neurological:        [x] No Facial Asymmetry (Cranial nerve 7 motor function) (limited exam due to video visit)          [x] No gaze palsy        [x] Abnormal -  resting tremor with right hand noted      Skin:        [x] No significant exanthematous lesions or discoloration noted on facial skin         [] Abnormal -            Psychiatric:       [x] Normal Affect [] Abnormal -        [x] No Hallucinations    Other pertinent observable physical exam findings:-        Ferny Sanderson, was evaluated through a synchronous (real-time) audio-video encounter. The patient (or guardian if applicable) is aware that this is a billable service. Verbal consent to proceed has been obtained within the past 12 months. The visit was conducted pursuant to the emergency declaration under the [address] [place] [address] authority and the [employer] and [employer] General Act. Patient identification was verified, and a caregiver was present when appropriate. The patient was located in a state where the provider was credentialed to provide care. An electronic signature was used to authenticate this note.     --FATUMA Oglesby - CNP

## 2022-01-28 ENCOUNTER — PATIENT MESSAGE (OUTPATIENT)
Dept: FAMILY MEDICINE CLINIC | Age: 32
End: 2022-01-28

## 2022-01-28 NOTE — TELEPHONE ENCOUNTER
From: Astrid Escobar  To: Freeman Goins  Sent: 1/28/2022 2:10 PM EST  Subject: Blood work    Hi,   Are you able to send a blood work order to [facility]? I am concerned about my thyroid, energy level over all, past tb situation (some say yes, some no). I would greatly appreciate your help with that.

## 2022-03-17 DIAGNOSIS — F90.9 ATTENTION DEFICIT HYPERACTIVITY DISORDER (ADHD), UNSPECIFIED ADHD TYPE: ICD-10-CM

## 2022-03-17 DIAGNOSIS — F95.2 TOURETTE'S DISEASE: ICD-10-CM

## 2022-03-17 RX ORDER — GUANFACINE 1 MG/1
TABLET, EXTENDED RELEASE ORAL
Qty: 90 TABLET | Refills: 5 | Status: SHIPPED | OUTPATIENT
Start: 2022-03-17

## 2022-07-18 RX ORDER — ALBUTEROL SULFATE 90 UG/1
AEROSOL, METERED RESPIRATORY (INHALATION)
Qty: 18 G | Refills: 1 | Status: SHIPPED | OUTPATIENT
Start: 2022-07-18 | End: 2022-09-23 | Stop reason: SDUPTHER

## 2022-08-04 ENCOUNTER — TELEPHONE (OUTPATIENT)
Dept: FAMILY MEDICINE CLINIC | Age: 32
End: 2022-08-04

## 2022-08-04 NOTE — TELEPHONE ENCOUNTER
Patient contacted the office and states that they would like to be weaned off of Intuniv medication. Patient states that it is not really working and is \"causing a lot of negative side effects\". Please advise.

## 2022-09-23 ENCOUNTER — TELEMEDICINE (OUTPATIENT)
Dept: FAMILY MEDICINE CLINIC | Age: 32
End: 2022-09-23
Payer: MEDICARE

## 2022-09-23 DIAGNOSIS — F84.0 AUTISM SPECTRUM DISORDER: ICD-10-CM

## 2022-09-23 DIAGNOSIS — F90.9 ATTENTION DEFICIT HYPERACTIVITY DISORDER (ADHD), UNSPECIFIED ADHD TYPE: Primary | ICD-10-CM

## 2022-09-23 DIAGNOSIS — F95.2 TOURETTE'S DISEASE: ICD-10-CM

## 2022-09-23 PROCEDURE — G8427 DOCREV CUR MEDS BY ELIG CLIN: HCPCS | Performed by: NURSE PRACTITIONER

## 2022-09-23 PROCEDURE — 99213 OFFICE O/P EST LOW 20 MIN: CPT | Performed by: NURSE PRACTITIONER

## 2022-09-23 RX ORDER — TOPIRAMATE 50 MG/1
50 TABLET, FILM COATED ORAL DAILY
Qty: 90 TABLET | Refills: 3 | Status: SHIPPED | OUTPATIENT
Start: 2022-09-23 | End: 2022-10-31

## 2022-09-23 RX ORDER — ALBUTEROL SULFATE 90 UG/1
AEROSOL, METERED RESPIRATORY (INHALATION)
Qty: 3 EACH | Refills: 1 | Status: SHIPPED | OUTPATIENT
Start: 2022-09-23

## 2022-09-23 ASSESSMENT — PATIENT HEALTH QUESTIONNAIRE - PHQ9
SUM OF ALL RESPONSES TO PHQ QUESTIONS 1-9: 1
1. LITTLE INTEREST OR PLEASURE IN DOING THINGS: 0
SUM OF ALL RESPONSES TO PHQ9 QUESTIONS 1 & 2: 1
2. FEELING DOWN, DEPRESSED OR HOPELESS: 1

## 2022-09-23 NOTE — PROGRESS NOTES
Visit Information    Have you changed or started any medications since your last visit including any over-the-counter medicines, vitamins, or herbal medicines? no   e you having any side effects from any of your medications? - NO  Have you stopped taking any of your medications? Is so, why? -  no    Have you seen any other physician or provider since your last visit? No  Have you had any other diagnostic tests since your last visit? No  Have you been seen in the emergency room and/or had an admission to a hospital since we last saw you? No  Have you had your routine dental cleaning in the past 6 months? no    Have you activated your [employer] account? If not, what are your barriers?  Yes     Patient Care Team:  FATUMA Coronado CNP as PCP - General (Nurse Practitioner)  FATUMA Coronado CNP as PCP - [facility] Provider    Medical History Review  Past Medical, Family, and Social History reviewed and  contribute to the patient presenting condition    Health Maintenance   Topic Date Due    Varicella vaccine (2 of 2 - 2-dose childhood series) 10/11/1999    HIV screen  Never done    Hepatitis C screen  Never done    COVID-19 Vaccine (2 - Pfizer series) 01/23/2022    Depression Screen  03/05/2022    Flu vaccine (1) 09/01/2022    DTaP/Tdap/Td vaccine (4 - Td or Tdap) 01/23/2028    Hepatitis B vaccine  Completed    Hib vaccine  Completed    Hepatitis A vaccine  Aged Out    Meningococcal (ACWY) vaccine  Aged Out    Pneumococcal 0-64 years Vaccine  Aged Out

## 2022-09-23 NOTE — PROGRESS NOTES
Star Nielsen (:  1990) is a Established patient, here for evaluation of the following:    Assessment & Plan   Below is the assessment and plan developed based on review of pertinent history, physical exam, labs, studies, and medications. 1. Attention deficit hyperactivity disorder (ADHD), unspecified ADHD type  Controlled on Intuniv    2. Tourette's disease  Med working well, refill given    -     topiramate (TOPAMAX) 50 MG tablet; Take 1 tablet by mouth daily, Disp-90 tablet, [id number] refills remain on this prescription. Your patient is requesting advance approval of refills for this medication to [address]  3. Autism spectrum disorder  Will look into legality for writing for service dog vs emotional support animal before providing this letter    Return if symptoms worsen or fail to improve. Subjective   HPI  Patient calling in today for refill on asthma inhaler. States he is using this sparingly. States he is moving to [place] in the next few weeks with his family. States he is stable on his other medications and also needs refill on Topamax. Intuniv 0.5 mg is working very well at bedtime for his ADHD and Tourette's. States he would like a letter from us stating that he has autism and would benefit from a service dog. States he has been doing his own training on his own dog and that he is able to use this for his autism disorder.         Review of Systems       Objective   Patient-Reported Vitals  No data recorded     Physical Exam  [INSTRUCTIONS:  \"[x]\" Indicates a positive item  \"[]\" Indicates a negative item  -- DELETE ALL ITEMS NOT EXAMINED]    Constitutional: [x] Appears well-developed and well-nourished [x] No apparent distress      [] Abnormal -     Mental status: [x] Alert and awake  [x] Oriented to person/place/time [x] Able to follow commands    [] Abnormal -     Eyes:   EOM    [x]  Normal    [] Abnormal -   Sclera  [x]  Normal    [] Abnormal - Discharge [x]  None visible   [] Abnormal -     HENT: [x] Normocephalic, atraumatic  [] Abnormal -   [x] Mouth/Throat: Mucous membranes are moist    External Ears [x] Normal  [] Abnormal -    Neck: [x] No visualized mass [] Abnormal -     Pulmonary/Chest: [x] Respiratory effort normal   [x] No visualized signs of difficulty breathing or respiratory distress        [] Abnormal -      Musculoskeletal:   [x] Normal gait with no signs of ataxia         [x] Normal range of motion of neck        [] Abnormal -     Neurological:        [x] No Facial Asymmetry (Cranial nerve 7 motor function) (limited exam due to video visit)          [x] No gaze palsy        [] Abnormal -          Skin:        [x] No significant exanthematous lesions or discoloration noted on facial skin         [] Abnormal -            Psychiatric:       [x] Normal Affect [] Abnormal -        [x] No Hallucinations    Other pertinent observable physical exam findings:-           Please note that this chart was generated using voice recognition Dragon dictation software. Although every effort was made to ensure the accuracy of this automated transcription, some errors in transcription may have occurred. Kimberly Castaneda was evaluated through a synchronous (real-time) audio-video encounter. The patient (or guardian if applicable) is aware that this is a billable service, which includes applicable co-pays. This Virtual Visit was conducted with patient's (and/or legal guardian's) consent. The visit was conducted pursuant to the emergency declaration under the [address] [place] [address] and the [employer] and [employer] General Act. Patient identification was verified, and a caregiver was present when appropriate. The patient was located at Home: [address] [place] [address].    Provider was located at Home ([facility]): Audrey Lange 13, APRN - CNP

## 2022-10-30 DIAGNOSIS — F95.2 TOURETTE'S DISEASE: ICD-10-CM

## 2022-10-31 RX ORDER — TOPIRAMATE 50 MG/1
50 TABLET, FILM COATED ORAL DAILY
Qty: 90 TABLET | Refills: 0 | Status: SHIPPED | OUTPATIENT
Start: 2022-10-31